# Patient Record
Sex: FEMALE | Race: WHITE
[De-identification: names, ages, dates, MRNs, and addresses within clinical notes are randomized per-mention and may not be internally consistent; named-entity substitution may affect disease eponyms.]

---
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
## 2024-03-08 ENCOUNTER — APPOINTMENT (OUTPATIENT)
Dept: DERMATOLOGY | Facility: CLINIC | Age: 19
End: 2024-03-08
Payer: COMMERCIAL

## 2024-03-08 DIAGNOSIS — L70.0 ACNE VULGARIS: ICD-10-CM

## 2024-03-08 DIAGNOSIS — L70.8 OTHER ACNE: ICD-10-CM

## 2024-03-08 PROCEDURE — 11900 INJECT SKIN LESIONS </W 7: CPT

## 2024-03-08 PROCEDURE — 99213 OFFICE O/P EST LOW 20 MIN: CPT | Mod: 25

## 2024-03-08 RX ORDER — MUPIROCIN 20 MG/G
2 OINTMENT TOPICAL
Qty: 1 | Refills: 0 | Status: ACTIVE | COMMUNITY
Start: 2024-03-08 | End: 1900-01-01

## 2024-03-08 NOTE — ASSESSMENT
[FreeTextEntry1] : 1. Acneiform eruption, right vermillion border of the upper lip/ right upper cutaneous lip  - New diagnosis with uncertain prognosis.  - No suspicion for orofacial HSV.  - Diagnosis reviewed.  - Continue 2-week course of cefdinir for nasopharyngitis.  - Start mupirocin 2% ointment TID until resolution.   Procedure note: Intralesional injection of triamcinolone (IL-TAC)  Site(s): right upper cutaneous lip  1 lesion injected with intralesional triamcinolone  2.5 mg/cc, Lot # [id number], Exp 12/2025 0.1 ccs injected total Verbal informed consent obtained. Risks/benefits/alternatives discussed including but not limited to risk of bleeding, hypopigmentation, striae and atrophy as well as possible lack of treatment efficacy and need for repeat treatments. Site confirmed. Area prepped with alcohol. Discussed wound care instructions. Patient tolerated well with no immediate complications.  Patient going back to school ([employer]) on Sunday. She is to reach out to us if no resolution within 1 mo.   Not addressed today- to f/u with Dr. Paige----------- #HS involving groin/buttocks, chronic, flaring - Dr. Paige previously discussed options to treat with either humira (injected every week) or cosentyx (injected once a month), SED - Patient would like to start cosentyx in May when she will be home from school for long enough to do loading dose at home  # High risk medication use--anticipation of cosentyx - CBC, CMP, hep serologies, quant gold pending.   RTC May 2024

## 2024-03-08 NOTE — HISTORY OF PRESENT ILLNESS
[FreeTextEntry1] : rpa: bump on the lip [de-identified] : 18-year-old F last seen Dec 2023 by Dr. Paige, here with her mom for evaluation of a bump on her lip x 1 week. Enlarging, not painful, but uncomfortable. Denies tingling or burning sensation prior to onset.  Started 2-week course of cefdinir for nasopharyngitis yesterday.   Dermatologic history: HS, notes that it is currently well-controlled.

## 2024-03-08 NOTE — PHYSICAL EXAM
[Alert] : alert [Oriented x 3] : ~L oriented x 3 [Well Nourished] : well nourished [Conjunctiva Non-injected] : conjunctiva non-injected [No Visual Lymphadenopathy] : no visual  lymphadenopathy [No Clubbing] : no clubbing [No Edema] : no edema [No Bromhidrosis] : no bromhidrosis [No Chromhidrosis] : no chromhidrosis [FreeTextEntry3] : Focused skin exam performed  The relevant portions of the exam were performed today  AAOx3, NAD, well-appearing / pleasant Focused examination within normal limits with the exception of:  Large double-headed erythematous pustule at the right vermillion border of the upper lip with some extension into the right upper cutaneous lip

## 2024-05-14 ENCOUNTER — APPOINTMENT (OUTPATIENT)
Dept: DERMATOLOGY | Facility: CLINIC | Age: 19
End: 2024-05-14